# Patient Record
Sex: MALE
[De-identification: names, ages, dates, MRNs, and addresses within clinical notes are randomized per-mention and may not be internally consistent; named-entity substitution may affect disease eponyms.]

---

## 2019-09-13 NOTE — CT
CT Abdomen Pelvis W Con



HISTORY: Right lower quadrant pain since midnight



COMPARISON: None.



FINDINGS: The lung bases are clear.



The liver spleen pancreas and gallbladder regions all appear unremarkable.



Right and left adrenal glands and right and left kidneys are normal in size. There is no significant 
periaortic or mesenteric lymphadenopathy.



CT of pelvis performed with contrast enhancement: There is no evidence of pelvic lymphadenopathy or m
ass. The appendix is air-filled, there is some minimal. Appendiceal fat stranding adjacent to the

tip of the appendix these changes are minimal and therefore equivocal as to their significance. Early
 appendicitis is not totally excluded as a possibility.



IMPRESSION: Minimal fat stranding adjacent to the tip of the appendix the remainder the appendix is a
ir-filled with the exception of the tip of the appendix, I cannot exclude the possibly this

represents early appendicitis change.



Reported By: Taurus Garcia 

Electronically Signed:  9/13/2019 11:23 PM

## 2019-09-14 NOTE — HP
CHIEF COMPLAINT:  Right lower quadrant abdominal pain.



HISTORY OF PRESENT ILLNESS:  The patient is a 24-year-old male with a 1-1/2 day

history of right lower quadrant pain associated with nausea, no vomiting.  No fever.

 CT scan shows appendicitis. 



PAST MEDICAL HISTORY:  Otherwise, healthy.



PAST SURGICAL HISTORY:  None.



MEDICATIONS:  No medications.



ALLERGIES:  NO KNOWN DRUG ALLERGIES.



SOCIAL HISTORY:  He works in [employer].  He drinks alcohol daily.  He says he uses

occasional cigarettes, but rare. 



PHYSICAL EXAMINATION:

VITAL SIGNS:  Temperature is 98, pulse 106, and blood pressure 135/84. 

GENERAL:  Well-developed, well-nourished male, in no apparent distress. 

HEENT:  Unremarkable. 

LUNGS:  Clear. 

HEART:  Regular rate and rhythm. 

ABDOMEN:  Soft, tender to percussion in right lower quadrant. 

EXTREMITIES:  Unremarkable.



LABORATORY DATA:  White count 12.7, H and H are 16 and 47, and platelet count 326.

Electrolytes are fine.  Urinalysis fine. 



IMAGING DATA:  CT scan shows appendiceal fat stranding adjacent to the tip of the

appendix for possible appendicitis. 



ASSESSMENT:  Probable appendicitis.



PLAN:  Laparoscopic appendectomy.



CONSENT:  I have discussed planned procedure as well as risk of bleeding, infection,

injury to bladder, bowel, need to open, he understands, gives informed consent. 







Job ID:  [id number]

## 2019-09-16 NOTE — OP
DATE OF PROCEDURE:  09/14/2019



PREOPERATIVE DIAGNOSIS:  Acute appendicitis.



PROCEDURE PERFORMED:  Laparoscopic appendectomy.



INDICATIONS:  This is a 24-year-old male who presents with a 1-day history of right

lower quadrant pain associated with nausea.  CT showing appendicitis. 



FINDINGS:  Acute suppurative appendicitis in the tip.



DESCRIPTION OF PROCEDURE:  After informed consent was obtained, the patient was

taken to the operating room, given general endotracheal anesthesia, and placed in

supine position.  Abdomen was prepped and draped in usual fashion.  Local anesthesia

infiltrated subcutaneously and deep subumbilical incision was performed.

Subcutaneous divided sharply, the fascia grasped and 2 stay sutures of 0 Vicryl

placed in each side of midline.  Midline incised.  Digital palpation revealed no

local adhesions.  A blunt 12 mm trocar inserted.  Pneumoperitoneum was created to a

pressure of 15 mmHg.  A 0 degree laparoscope inserted under direct vision and two 5

mm ports were placed, 1 suprapubic and 1 in the right lateral abdomen.  The appendix

was found.  The tip was encased in omentum and it was inflamed, but non perforated.

It was a long appendix.  The mesoappendix was divided utilizing the LigaSure.  The

base of the appendix was divided utilizing the linear 45 mm white load stapler.  The

appendix was placed in Endosac and removed from the abdomen in the Endosac.

Hemostasis was assured.  The trocars and retractors removed.  The fascia closed with

interrupted 0 Vicryl suture.  The skin closed with interrupted 4-0 Rapide.

Dermabond applied.  The patient tolerated the procedure well and transferred to

Recovery in good condition.  Sponge and needle count verified correct x2. 







Job ID:  [id number]

## 2023-02-28 ENCOUNTER — HOSPITAL ENCOUNTER (EMERGENCY)
Dept: HOSPITAL 92 - ERS | Age: 28
Discharge: HOME | End: 2023-02-28
Payer: SELF-PAY

## 2023-02-28 DIAGNOSIS — F10.180: Primary | ICD-10-CM

## 2023-02-28 DIAGNOSIS — F17.210: ICD-10-CM

## 2023-02-28 PROCEDURE — 99283 EMERGENCY DEPT VISIT LOW MDM: CPT
